# Patient Record
Sex: MALE | Race: WHITE
[De-identification: names, ages, dates, MRNs, and addresses within clinical notes are randomized per-mention and may not be internally consistent; named-entity substitution may affect disease eponyms.]

---

## 2020-07-17 ENCOUNTER — HOSPITAL ENCOUNTER (OUTPATIENT)
Dept: HOSPITAL 76 - SC | Age: 35
Discharge: HOME | End: 2020-07-17
Attending: NURSE PRACTITIONER
Payer: COMMERCIAL

## 2020-07-17 VITALS — SYSTOLIC BLOOD PRESSURE: 112 MMHG | DIASTOLIC BLOOD PRESSURE: 78 MMHG

## 2020-07-17 DIAGNOSIS — R06.83: Primary | ICD-10-CM

## 2020-07-17 DIAGNOSIS — R06.81: ICD-10-CM

## 2020-07-17 DIAGNOSIS — G47.10: ICD-10-CM

## 2020-07-17 DIAGNOSIS — R51: ICD-10-CM

## 2020-07-17 PROCEDURE — 99212 OFFICE O/P EST SF 10 MIN: CPT

## 2020-07-17 PROCEDURE — 99204 OFFICE O/P NEW MOD 45 MIN: CPT

## 2020-07-17 NOTE — SLEEP CARE CONSULTATION
Information from patient questionnaire entered by Alia Fonseca.





I have reviewed and concur with the information entered by Alia Fonseca. 

This document represents the service I personally performed and the decisions 

made by me, Sandy Hart ARNP.





History of Present Illness


Service Date and Time: 07/17/2020    1545


Reason for Visit: New patient


Chief Complaint: reports: Unrefreshed sleep, Snoring (heard in next room), 

Excessive daytime sleepiness, Observed pauses in breathing (wife noticed), 

Fatigue, Frequent awakenings at night.  denies: Insomnia


Duration of Symptoms: 3 years


Usual bedtime: 10:30 - 11 pm


Time it takes to fall asleep: 1 hour


Snores at night: Yes


Observed to quit breathing while asleep: Yes


Sleeps alone due to snoring: No


Number of times waking at night: 3


Reasons for waking at night: reports: Snoring, Gasping for air.  denies: Ch

oking, Pain


Toss, Turn, or Twitch while sleeping: Yes


Recalls having dreams: No (not lately)


Usually gets out of bed at: 5 am


Feels refreshed in the morning: No


Morning headache: Yes (sometimes, thinks due to lack of sleep; last 3-4 hours, 

takes aleve prn)


Sleepy or fatigued during the day: Yes (fatigued)


Ever fallen asleep while driving: No


Takes day naps: No (weekends maybe)


Dreams during day naps: No


Prior sleep studies: No


Additional HPI information: 





Patient looking for answers due to loud snoring and fatigue. 





- Parasomnia Symptoms


Ever been unable to move upon waking from sleep: Yes (two "out of body 

experiences", sees self/cannot move and then waking up)


Walks in sleep: No


Talks in sleep: No


Ever acted out dreams in sleep: No


Ever felt weak in the knees when startled or emotional: No


Bothered by creepy, crawly, restless sensations in legs: Yes (occasionally felt 

numb legs or urge to move legs)


Problems with memory or concentration: No





Subjective


Initial [place] Sleepiness Scale score: 14 (in 2020)





Past Medical History


Past Medical History: denies: Hypertension, Congestive Heart Failure, Diabetes, 

Stroke, Insulin resistance, Arrythmia, Hypothyroidism, Anemia, Anxiety, 

Impotence, Asthma, Depression, Mood disorder, GERD, Attention deficit





Social History


The patient's occupation is a [employer] . Patient is  and lives in [place]. 





Have you smoked in the past 12 months: No


Alcohol use: No


Caffeine use: Yes


Caffeine amount and frequency: maybe 1 a day





Family History


Family history of sleep disordered breathing: No


Family Hx Sleep Apnea: Father: Snoring





Allergies and Home Medications


Drug allergies reviewed: Yes (NKDA)


Home medication list reviewed: Yes (no medications)





Review of Systems


Cardiovascular: denies: high blood pressure, palpitations, chest pain, irregular

heart rate or pulse, leg or foot swelling


Respiratory: denies: shortness of breath, wheeze, chronic cough


Gastrointestinal: reports: heartburn.  denies: difficulty swallowing, abdominal 

pain


Neurological: denies: headaches, seizure, head trauma, disorientation, speech dy

sfunction, gait or balance problems, fainting or unconsciousness


Psychiatric: denies: Attention Deficit Hyperactivity, anxiety, depression, mood 

disorder


Ear/Nose/Throat: reports: nose bleeds (got when younger due to thin nasal 

mucosa/blood vessels), dry mouth/throat (occasional in mornings), wisdom teeth 

removed.  denies: nasal congestion, sinus problems, hoarseness, injury to nose, 

tonsillectomy


Endocrine: denies: thyroid disease, sluggishness, too hot or cold, excessive 

thirst, increased appetite


Musculoskeletal: reports: back pain, muscle pain or cramping


Immunologic: reports: sneezing, allergies to food or environment (seasonal 

allergies)





Physical Exam


Blood Pressure: 112/78


Cuff size: long


Heart Rate: 86


O2 Saturation: 96


Height: 6 ft


Weight: 182 lb


Body Mass Index: 24.7


BMI Classification: Healthy weight


Neck circumference: 14.25


Nasal exam: negative: erythema, blood tinged nasal secretions


HEENT: No craniofacial malformation


Nostrils: patent to airflow


Turbinates: boggy


Septum: midline


Mouth and throat: normal


Soft palate: normal


Hard palate: normal


Uvula: normal


Uvula visualization: 50% Mallampati Class II


Tongue: normal in size


Tonsils: 1+


Chin and jaw: normal size and position


Neck: normal w/o lymphadenopathy or thyromegaly


Heart: regular rate and rhythm


Lungs: clear bilaterally





Impression and Plan





1. Suspected Obstructive Sleep Apnea-Hypopnea Syndrome, as suggested by a hi

story of loud and irregular snoring, observed cessation of breath while asleep, 

gasping or choking in sleep, morning headache, and excessive daytime sleepiness.

His [place] evaluation was 14 today. I recommend proceeding to polysomnography 

to confirm the diagnosis and to assess severity. I informed the patient of what 

the sleep studies involve and after some discussion, obtained agreement to proce

ed. The pathophysiology of obstructive sleep apnea-hypopnea syndrome was 

discussed with the patient and health risks of cardiovascular and 

cerebrovascular disease if not treated. [place] brochure for obstructive sleep 

apnea-hypopnea syndrome given and reviewed. Risks of drowsy driving discussed in

detail and patient advised to avoid long distance driving and to pull over at 

the first sign of drowsiness. Patient agreed to plan. 





* Schedule polysomnography and return in 1-2 weeks after the study to discuss 

  result and initiate therapy.


* Avoid long distance driving or driving when feeling sleepy.


* Avoid alcohol, sedative and muscle relaxant around bedtime.


* Review instructions provided by trained office staff on how to prepare for the

  sleep study.


* Return for follow-up after sleep study completed.








Visit Type: In Office


Time Spent with Patient (minutes): 28


Provider Statement: I spent 100% of the Face to Face Visit with the patient with

greater than 50% spent counseling the patient and coordination of care.

## 2020-10-06 ENCOUNTER — HOSPITAL ENCOUNTER (OUTPATIENT)
Dept: HOSPITAL 76 - SC | Age: 35
Discharge: HOME | End: 2020-10-06
Attending: NURSE PRACTITIONER
Payer: COMMERCIAL

## 2020-10-06 DIAGNOSIS — G47.8: Primary | ICD-10-CM

## 2020-10-06 PROCEDURE — 99212 OFFICE O/P EST SF 10 MIN: CPT

## 2020-10-06 NOTE — SLEEP CARE CONSULTATION
Information from patient questionnaire entered by Estela Rosa.





I have reviewed and concur with the information entered by Estela Rosa. This 

document represents the service I personally performed and the decisions made by

me, Sandy Hart ARNP.





History of Present Illness


Service Date and Time: 10/06/2020    1505


Previous diagnosis: Mild, Other (Upper airway resistance syndrome)


AHI: 2.3 (RDI of 5.6)


Reason for follow up: first compliance (8/30)


Equipment type: CPAP


Equipment obtained from: [employer] (getting supplies as needed)


Mask style: Full face (AirMed)


Mask brand: Resmed


Backup mask available: Yes (other mask/full face)


Last cushion change: 6 weeks


Prior sleep studies: No


Year and Where: 2020 [facility] [place]


Type of Sleep Study: Polysomnography


HPI additional information: 





ELMER HADLEY was diagnosed to have mild, AHI 2.3 with RDI 5.6, upper airway 

resistance syndrome and returned today for CPAP therapy first compliance follow-

up.





Sleep Study





- Results


Prior sleep studies: No





CPAP Compliance Data





- Data Reviewed with Patient


Average duration of nightly device use: 3 hours 54 minutes


Compliance rate %: 73


Current pressure setting (cmH2O): 4-15


Average residual AHI: 2.6


Central apnea: 1.8


Obstructive apnea: 0.3


Hypopnea: 0.5


Average large leak: 1.2 L/min





Subjective


Patient concerns: reports: condensation in mask/hose (hose), nasal congestion 

(sometimes when allergies), dry mouth, nose, throat (sometimes, adjusting 

humidity up).  denies: aerophagia, mask discomfort, air blowing in eyes, mask 

leak noise, epistaxis, other


Observed to snore while using device: No


Current pressure setting perceived as: comfortable (starts out good but then it 

feels like not enough)


On therapy, patient: reports: sleeping better, awakening more refreshed, being 

more awake and alert during the day, more rested overall.  denies: drowsiness 

while driving


Initial [place] Sleepiness Scale score: 14 (in 2020)


Current [place] Sleepiness Scale score: 8





Allergies and Home Medications


Drug allergies reviewed: Yes (NKDA)


Home medication list reviewed: Yes (no changes)





Review of Systems


Review of systems same as previous: Yes (no changes)





Physical Exam


Heart Rate: 77


O2 Saturation: 98


Height: 6 ft


Weight: 183 lb


Body Mass Index: 24.8


BMI Classification: Healthy weight





Impression and Plan





1. Upper airway resistance syndrome, mild, with fair treatment compliance and 

good apnea control. On CPAP therapy, the patient has better sleep quality and is

more rested overall. He has been experiencing some air hunger, like the pressure

is not enough. I will increase the starting pressure to 7-11 cm H2O to see if 

this will resolve this issue. He is to follow up again in about 1-2 months.  He 

has had some issue with dry mouth and states he has been seen sleeping with 

mouth open. He may use a chin strap. He was also advised to adjust his 

humidity/heated hose setting. He has placed his machine lower than his head to 

keep condensation out of mask. It is still in hose. Oral dryness can be reduced 

by adjusting humidity setting higher or heated hose lower or by adjusting both 

settings. Patient's apnea severity and rationale for treatment to reduce apnea, 

improve sleep quality and reduce cardiovascular and cerebrovascular events was 

reviewed. 





* Change auto CPAP pressure to 7-11  cmH2O


* Notify me if snoring with mask or feeling that the pressure is too much or too

  little


* Attempt to lose weight


* Call this office if any problems using CPAP


* Return for follow up in 1-2 months, or sooner if concerns arise





Counseling Topics: Weight loss health impact


Visit Type: In Office


Time Spent with Patient (minutes): 23


Provider Statement: I spent 100% of the Face to Face Visit with the patient with

greater than 50% spent counseling the patient and coordination of care.

## 2020-11-11 ENCOUNTER — HOSPITAL ENCOUNTER (OUTPATIENT)
Dept: HOSPITAL 76 - SC | Age: 35
Discharge: HOME | End: 2020-11-11
Attending: NURSE PRACTITIONER
Payer: COMMERCIAL

## 2020-11-11 DIAGNOSIS — G47.8: Primary | ICD-10-CM

## 2020-11-11 PROCEDURE — 99212 OFFICE O/P EST SF 10 MIN: CPT

## 2020-11-11 PROCEDURE — 99213 OFFICE O/P EST LOW 20 MIN: CPT

## 2020-11-11 NOTE — SLEEP CARE CONSULTATION
Information from patient questionnaire entered by Estela Rosa.





I have reviewed and concur with the information entered by Estela Rosa. This 

document represents the service I personally performed and the decisions made by

me, Sandy Hart ARNP.





History of Present Illness


Service Date and Time: 11/11/2020    1439


Previous diagnosis: Mild, Other (Upper airway resistance syndrome)


AHI: 2.3 ((RDI 5.6))


Reason for follow up: one month (followup - pressure change)


Equipment type: CPAP


Equipment obtained from: [employer] (has not received any supplies yet, needs to 

order)


Mask style: Full face


Backup mask available: Yes (other mask)


Last cushion change: 2 months


Prior sleep studies: No


Year and Where: 2020 [facility]


Type of Sleep Study: Polysomnography


HPI additional information: 





ELMER HADLEY was diagnosed to have mild, AHI 2.3 (RDI 5.6), upper airway 

resistance syndrome and returned today for CPAP therapy one month pressure 

change follow-up.





Sleep Study





- Results


Type of Sleep Study: Polysomnography


Prior sleep studies: No


Year and Where: 2020 [facility]





CPAP Compliance Data





- Data Reviewed with Patient


Average duration of nightly device use: 4 h 32 min


Compliance rate %: 80


Current pressure setting (cmH2O): 7-11


Average residual AHI: 2.2 (RERA 0.4)


Central apnea: 1.5


Obstructive apnea: 0.2


Average large leak: 0.5 L/min





Subjective


Missed days of use due to: reports: other (work)


Patient concerns: reports: dry mouth, nose, throat (dry mouth).  denies: 

aerophagia, mask discomfort, air blowing in eyes, mask leak noise, condensation 

in mask/hose, nasal congestion, epistaxis, other


Observed to snore while using device: Yes (once or twice)


Current pressure setting perceived as: comfortable


On therapy, patient: reports: sleeping better, being more awake and alert during

the day.  denies: awakening more refreshed, more rested overall, drowsiness 

while driving


Initial [place] Sleepiness Scale score: 14 (in 2020)


Current [place] Sleepiness Scale score: 12





Allergies and Home Medications


Drug allergies reviewed: Yes (NKDA)


Home medication list reviewed: Yes (no changes)





Review of Systems


Review of systems same as previous: Yes (no changes)





Physical Exam


Heart Rate: 62


O2 Saturation: 98


Height: 6 ft


Weight: 183 lb


Body Mass Index: 24.8


BMI Classification: Healthy weight





Impression and Plan





1. Upper Airway Resistance Syndrome, mild, with good treatment compliance and 

good control of upper airway resistance with RERAs at 0.4. He will continue at 

present pressure range. On CPAP therapy, the patient has better sleep quality 

but is not sure he is feeling more rested overall. His [place] has improved to 

12 from 14 initially. He is averaging 4.5 hours nightly of sleep on his CPAP 

machine due to his work schedule. I advised him that we need on average 7-9 

hours of sleep to get the best benefit from sleeping and CPAP therapy. He was 

encouraged to try to increase his time in bed sleeping to at least 6.5-7 hours 

to received the most benefit and reduce his sleepiness. He has been having mouth

dryness in the mornings and has tried to adjust the humidity setting. Oral 

dryness can be reduced by adjusting humidity setting higher or heated hose lower

or by adjusting both settings. Oral instructions given on how to change humidity

and heated hose settings with rationale explaining why to change. Patient 

advised that chronic oral dryness can affect dental health and advised to follow

up with dentist. In addition, there are oral dryness products that can be used 

to reduce dryness such as Biotene products, Dry mouth rinse and Xylomelts. P

atient to discuss best option with dentist. Patient's apnea severity and 

rationale for treatment to reduce apnea, improve sleep quality and reduce 

cardiovascular and cerebrovascular events was reviewed. 





* Continue auto  CPAP pressure at 7-11 cmH2O


* Notify me if snoring with mask or feeling that the pressure is too much or too

  little


* Call this office if any problems using CPAP


* Return for follow up in 3 months, or sooner if concerns arise





Counseling Topics: Spare mask


Visit Type: In Office


Time Spent with Patient (minutes): 18


Provider Statement: I spent 100% of the Face to Face Visit with the patient with

greater than 50% spent counseling the patient and coordination of care.

## 2021-02-16 ENCOUNTER — HOSPITAL ENCOUNTER (OUTPATIENT)
Dept: HOSPITAL 76 - SC | Age: 36
Discharge: HOME | End: 2021-02-16
Attending: NURSE PRACTITIONER
Payer: COMMERCIAL

## 2021-02-16 DIAGNOSIS — G47.8: Primary | ICD-10-CM

## 2021-02-16 PROCEDURE — 99212 OFFICE O/P EST SF 10 MIN: CPT

## 2021-02-16 NOTE — SLEEP CARE CONSULTATION
Information from patient questionnaire entered by Estela Rosa.





I have reviewed and concur with the information entered by Estela Rosa. This 

document represents the service I personally performed and the decisions made by

me, Sandy Hart ARNP.





History of Present Illness


Service Date and Time: 02/16/2021    1347


Previous diagnosis: Mild, Other


AHI: 2.3


Reason for follow up: three month (followup)


Equipment type: CPAP


Equipment obtained from: [employer] (getting supplies, got wrong size)


Mask style: Full face


Backup mask available: Yes (other mask)


Prior sleep studies: No


Year and Where: 2020 [facility]


Type of Sleep Study: Polysomnography


HPI additional information: 





ELMER HADLEY was diagnosed to have mild Upper Airway Resistance Syndrome with

an AHI 2.3 (RDI of 5.6), and returned today for CPAP therapy three month follow-

up.





Sleep Study





- Results


Type of Sleep Study: Polysomnography


Prior sleep studies: No


Year and Where: 2020 [facility]





CPAP Compliance Data





- Data Reviewed with Patient


Average duration of nightly device use: 5 h 15 min


Compliance rate %: 78


Current pressure setting (cmH2O): 7-11


Average residual AHI: 2.2 (RERA index 0.2)





Subjective


Missed days of use due to: reports: other (power outage, work)


Patient concerns: reports: condensation in mask/hose, dry mouth, nose, throat 

(dry mouth).  denies: aerophagia, mask discomfort, air blowing in eyes, mask 

leak noise, nasal congestion, epistaxis, other


Observed to snore while using device: No


Current pressure setting perceived as: comfortable


On therapy, patient: reports: sleeping better, awakening more refreshed, being 

more awake and alert during the day, more rested overall.  denies: drowsiness 

while driving


Initial [place] Sleepiness Scale score: 14 (in 2020)


Current [place] Sleepiness Scale score: 11





Allergies and Home Medications


Home medication list reviewed: Yes (no changes)





Review of Systems


Review of systems same as previous: Yes (no changes)





Physical Exam


Heart Rate: 66


O2 Saturation: 98


Height: 6 ft


Weight: 184 lb


Body Mass Index: 24.9


BMI Classification: Healthy weight





Impression and Plan





1. Upper Airway Resistance Syndrome, mild, with fair treatment compliance and 

good RERA control. On CPAP therapy, the patient has better sleep quality and is 

more rested overall. He has been having issues with dry mouth, tries to increase

the humidity but then gets condensation in the hose so he turns it back down. I 

advised that he increase the humidity and also the heated hose setting to reduce

oral dryness and condensation in the hose. He is also requesting his records 

because he will be moving in the summer and will not be here for a 6 month 

follow up. I advised him to establish with another sleep care center once he has

moved to continue his follow ups. Patient's apnea severity and rationale for 

treatment to reduce apnea, improve sleep quality and reduce cardiovascular and 

cerebrovascular events was reviewed.





* Continue auto  CPAP pressure at 7-11 cmH2O


* Notify me if snoring with mask or feeling that the pressure is too much or too

  little


* Attempt to lose weight


* Call this office if any problems using CPAP


* Return for follow up in  6 months, or sooner if concerns arise





Counseling Topics: Spare mask


Visit Type: In Office


Time Spent with Patient (minutes): 17


Provider Statement: I spent 100% of the Face to Face Visit with the patient with

greater than 50% spent counseling the patient and coordination of care.